# Patient Record
Sex: FEMALE | Race: WHITE | NOT HISPANIC OR LATINO | URBAN - METROPOLITAN AREA
[De-identification: names, ages, dates, MRNs, and addresses within clinical notes are randomized per-mention and may not be internally consistent; named-entity substitution may affect disease eponyms.]

---

## 2020-07-06 ENCOUNTER — INPATIENT (INPATIENT)
Facility: HOSPITAL | Age: 25
LOS: 1 days | Discharge: HOME | End: 2020-07-08
Attending: SPECIALIST | Admitting: SPECIALIST

## 2020-07-06 VITALS — TEMPERATURE: 98 F

## 2020-07-06 DIAGNOSIS — O41.00X0 OLIGOHYDRAMNIOS, UNSPECIFIED TRIMESTER, NOT APPLICABLE OR UNSPECIFIED: ICD-10-CM

## 2020-07-06 DIAGNOSIS — Z3A.00 WEEKS OF GESTATION OF PREGNANCY NOT SPECIFIED: ICD-10-CM

## 2020-07-06 DIAGNOSIS — O26.899 OTHER SPECIFIED PREGNANCY RELATED CONDITIONS, UNSPECIFIED TRIMESTER: ICD-10-CM

## 2020-07-06 LAB
BASOPHILS # BLD AUTO: 0.02 K/UL — SIGNIFICANT CHANGE UP (ref 0–0.2)
BASOPHILS NFR BLD AUTO: 0.2 % — SIGNIFICANT CHANGE UP (ref 0–2)
BLD GP AB SCN SERPL QL: NEGATIVE — SIGNIFICANT CHANGE UP
EOSINOPHIL # BLD AUTO: 0.02 K/UL — SIGNIFICANT CHANGE UP (ref 0–0.5)
EOSINOPHIL NFR BLD AUTO: 0.2 % — SIGNIFICANT CHANGE UP (ref 0–6)
HBV SURFACE AG SER-ACNC: NEGATIVE — SIGNIFICANT CHANGE UP
HCT VFR BLD CALC: 40.7 % — SIGNIFICANT CHANGE UP (ref 34.5–45)
HGB BLD-MCNC: 13.9 G/DL — SIGNIFICANT CHANGE UP (ref 11.5–15.5)
HIV COMBO RESULT: SIGNIFICANT CHANGE UP
HIV1+2 AB SPEC QL: SIGNIFICANT CHANGE UP
IMM GRANULOCYTES NFR BLD AUTO: 0.4 % — SIGNIFICANT CHANGE UP (ref 0–1.5)
LYMPHOCYTES # BLD AUTO: 2.84 K/UL — SIGNIFICANT CHANGE UP (ref 1–3.3)
LYMPHOCYTES # BLD AUTO: 33.7 % — SIGNIFICANT CHANGE UP (ref 13–44)
MCHC RBC-ENTMCNC: 30.5 PG — SIGNIFICANT CHANGE UP (ref 27–34)
MCHC RBC-ENTMCNC: 34.2 % — SIGNIFICANT CHANGE UP (ref 32–36)
MCV RBC AUTO: 89.5 FL — SIGNIFICANT CHANGE UP (ref 80–100)
MONOCYTES # BLD AUTO: 0.56 K/UL — SIGNIFICANT CHANGE UP (ref 0–0.9)
MONOCYTES NFR BLD AUTO: 6.6 % — SIGNIFICANT CHANGE UP (ref 2–14)
NEUTROPHILS # BLD AUTO: 4.96 K/UL — SIGNIFICANT CHANGE UP (ref 1.8–7.4)
NEUTROPHILS NFR BLD AUTO: 58.9 % — SIGNIFICANT CHANGE UP (ref 43–77)
NRBC # FLD: 0 K/UL — SIGNIFICANT CHANGE UP (ref 0–0)
PLATELET # BLD AUTO: 192 K/UL — SIGNIFICANT CHANGE UP (ref 150–400)
PMV BLD: 11.4 FL — SIGNIFICANT CHANGE UP (ref 7–13)
RBC # BLD: 4.55 M/UL — SIGNIFICANT CHANGE UP (ref 3.8–5.2)
RBC # FLD: 12.4 % — SIGNIFICANT CHANGE UP (ref 10.3–14.5)
RH IG SCN BLD-IMP: POSITIVE — SIGNIFICANT CHANGE UP
RH IG SCN BLD-IMP: POSITIVE — SIGNIFICANT CHANGE UP
WBC # BLD: 8.43 K/UL — SIGNIFICANT CHANGE UP (ref 3.8–10.5)
WBC # FLD AUTO: 8.43 K/UL — SIGNIFICANT CHANGE UP (ref 3.8–10.5)

## 2020-07-06 RX ORDER — SODIUM CHLORIDE 9 MG/ML
1000 INJECTION, SOLUTION INTRAVENOUS
Refills: 0 | Status: DISCONTINUED | OUTPATIENT
Start: 2020-07-06 | End: 2020-07-07

## 2020-07-06 RX ORDER — OXYTOCIN 10 UNIT/ML
333.33 VIAL (ML) INJECTION
Qty: 20 | Refills: 0 | Status: COMPLETED | OUTPATIENT
Start: 2020-07-06 | End: 2020-07-06

## 2020-07-06 RX ADMIN — SODIUM CHLORIDE 125 MILLILITER(S): 9 INJECTION, SOLUTION INTRAVENOUS at 17:45

## 2020-07-06 NOTE — OB PROVIDER TRIAGE NOTE - NSOBPROVIDERNOTE_OBGYN_ALL_OB_FT
24 year old female P1 at 40.6weeks Oligo EUGENIO 3 for IOL with po Cytotec   GBS negative 6/12/20     case d/w DR Hailey Nice 19 testing done    Damian ARENAS

## 2020-07-06 NOTE — OB PROVIDER TRIAGE NOTE - NSHPPHYSICALEXAM_GEN_ALL_CORE
pt seen and examined    ICU Vital Signs Last 24 Hrs  T(C): 36.6 (06 Jul 2020 17:26), Max: 36.6 (06 Jul 2020 16:45)  T(F): 97.9 (06 Jul 2020 17:26), Max: 97.9 (06 Jul 2020 16:46)  HR: 74 (06 Jul 2020 17:26) (74 - 74)  BP: 104/68 (06 Jul 2020 17:26) (104/68 - 104/68)  RR: 18 (06 Jul 2020 17:26) (16 - 18)    pt in NAD  AOx3   lungs clear   heart S1 S2   abd soft nontender   placed on EFM     ve 1-2/50/-3 at office at 3pm

## 2020-07-06 NOTE — OB PROVIDER TRIAGE NOTE - HISTORY OF PRESENT ILLNESS
29 year old female  EDC 6/30/20 at 40.6 weeks who presents from office  noted to have oligo   denied any lof vb  feels good fetal movements   state  GBS negative    EFW 7#   denied any ap issues denied any fetal issues     med hx denied

## 2020-07-06 NOTE — OB PROVIDER H&P - ASSESSMENT
24 year old female P1 at 40.6weeks Oligo EUGENIO 3 for IOL with po Cytotec   GBS negative 6/12/20     case d/w DR Hailey Nice 19 testing done    Damian

## 2020-07-06 NOTE — CHART NOTE - NSCHARTNOTEFT_GEN_A_CORE
R1 Progress Note    Patient seen and examined at bedside for assessment for epidural placement. VSE: 3.5/70/-3, confirmed by PGY-4 Cara Green.       NAD  Vital Signs Last 24 Hrs  T(C): 36.6 (06 Jul 2020 17:26), Max: 36.6 (06 Jul 2020 16:45)  T(F): 97.9 (06 Jul 2020 17:26), Max: 97.9 (06 Jul 2020 16:46)  HR: 61 (06 Jul 2020 22:31) (59 - 107)  BP: 108/58 (06 Jul 2020 21:58) (77/53 - 108/58)  BP(mean): --  RR: 18 (06 Jul 2020 17:26) (16 - 18)  SpO2: 97% (06 Jul 2020 22:31) (93% - 100%)    SVE: 3.5/70/-3   EFM: baseline 120s, moderate variability, accelerations present, no decels.   TOCO: q4-5 min.    Plan     - Continue PO cyto   - FHT Category 1   - Start epi     d/w Dr. Hailey Wong PGY-1

## 2020-07-07 LAB
RUBV IGG SER-ACNC: 2 INDEX — SIGNIFICANT CHANGE UP
RUBV IGG SER-IMP: POSITIVE — SIGNIFICANT CHANGE UP
SARS-COV-2 RNA SPEC QL NAA+PROBE: SIGNIFICANT CHANGE UP
T PALLIDUM AB TITR SER: NEGATIVE — SIGNIFICANT CHANGE UP

## 2020-07-07 RX ORDER — SIMETHICONE 80 MG/1
80 TABLET, CHEWABLE ORAL EVERY 4 HOURS
Refills: 0 | Status: DISCONTINUED | OUTPATIENT
Start: 2020-07-07 | End: 2020-07-08

## 2020-07-07 RX ORDER — LANOLIN
1 OINTMENT (GRAM) TOPICAL EVERY 6 HOURS
Refills: 0 | Status: DISCONTINUED | OUTPATIENT
Start: 2020-07-07 | End: 2020-07-08

## 2020-07-07 RX ORDER — PRAMOXINE HYDROCHLORIDE 150 MG/15G
1 AEROSOL, FOAM RECTAL EVERY 4 HOURS
Refills: 0 | Status: DISCONTINUED | OUTPATIENT
Start: 2020-07-07 | End: 2020-07-08

## 2020-07-07 RX ORDER — DIPHENHYDRAMINE HCL 50 MG
25 CAPSULE ORAL EVERY 6 HOURS
Refills: 0 | Status: DISCONTINUED | OUTPATIENT
Start: 2020-07-07 | End: 2020-07-08

## 2020-07-07 RX ORDER — IBUPROFEN 200 MG
600 TABLET ORAL EVERY 6 HOURS
Refills: 0 | Status: COMPLETED | OUTPATIENT
Start: 2020-07-07 | End: 2021-06-05

## 2020-07-07 RX ORDER — ACETAMINOPHEN 500 MG
3 TABLET ORAL
Qty: 0 | Refills: 0 | DISCHARGE
Start: 2020-07-07

## 2020-07-07 RX ORDER — AER TRAVELER 0.5 G/1
1 SOLUTION RECTAL; TOPICAL EVERY 4 HOURS
Refills: 0 | Status: DISCONTINUED | OUTPATIENT
Start: 2020-07-07 | End: 2020-07-08

## 2020-07-07 RX ORDER — IBUPROFEN 200 MG
1 TABLET ORAL
Qty: 0 | Refills: 0 | DISCHARGE
Start: 2020-07-07

## 2020-07-07 RX ORDER — HYDROCORTISONE 1 %
1 OINTMENT (GRAM) TOPICAL EVERY 6 HOURS
Refills: 0 | Status: DISCONTINUED | OUTPATIENT
Start: 2020-07-07 | End: 2020-07-08

## 2020-07-07 RX ORDER — OXYTOCIN 10 UNIT/ML
2 VIAL (ML) INJECTION
Qty: 30 | Refills: 0 | Status: DISCONTINUED | OUTPATIENT
Start: 2020-07-07 | End: 2020-07-08

## 2020-07-07 RX ORDER — ACETAMINOPHEN 500 MG
975 TABLET ORAL
Refills: 0 | Status: DISCONTINUED | OUTPATIENT
Start: 2020-07-07 | End: 2020-07-08

## 2020-07-07 RX ORDER — TETANUS TOXOID, REDUCED DIPHTHERIA TOXOID AND ACELLULAR PERTUSSIS VACCINE, ADSORBED 5; 2.5; 8; 8; 2.5 [IU]/.5ML; [IU]/.5ML; UG/.5ML; UG/.5ML; UG/.5ML
0.5 SUSPENSION INTRAMUSCULAR ONCE
Refills: 0 | Status: DISCONTINUED | OUTPATIENT
Start: 2020-07-07 | End: 2020-07-08

## 2020-07-07 RX ORDER — SODIUM CHLORIDE 9 MG/ML
3 INJECTION INTRAMUSCULAR; INTRAVENOUS; SUBCUTANEOUS EVERY 8 HOURS
Refills: 0 | Status: DISCONTINUED | OUTPATIENT
Start: 2020-07-07 | End: 2020-07-08

## 2020-07-07 RX ORDER — IBUPROFEN 200 MG
600 TABLET ORAL EVERY 6 HOURS
Refills: 0 | Status: DISCONTINUED | OUTPATIENT
Start: 2020-07-07 | End: 2020-07-08

## 2020-07-07 RX ORDER — DIBUCAINE 1 %
1 OINTMENT (GRAM) RECTAL EVERY 6 HOURS
Refills: 0 | Status: DISCONTINUED | OUTPATIENT
Start: 2020-07-07 | End: 2020-07-08

## 2020-07-07 RX ORDER — MAGNESIUM HYDROXIDE 400 MG/1
30 TABLET, CHEWABLE ORAL
Refills: 0 | Status: DISCONTINUED | OUTPATIENT
Start: 2020-07-07 | End: 2020-07-08

## 2020-07-07 RX ORDER — KETOROLAC TROMETHAMINE 30 MG/ML
30 SYRINGE (ML) INJECTION ONCE
Refills: 0 | Status: DISCONTINUED | OUTPATIENT
Start: 2020-07-07 | End: 2020-07-07

## 2020-07-07 RX ORDER — BENZOCAINE 10 %
1 GEL (GRAM) MUCOUS MEMBRANE EVERY 6 HOURS
Refills: 0 | Status: DISCONTINUED | OUTPATIENT
Start: 2020-07-07 | End: 2020-07-08

## 2020-07-07 RX ADMIN — Medication 1000 MILLIUNIT(S)/MIN: at 03:53

## 2020-07-07 RX ADMIN — Medication 2 MILLIUNIT(S)/MIN: at 01:53

## 2020-07-07 RX ADMIN — Medication 1 TABLET(S): at 16:04

## 2020-07-07 RX ADMIN — Medication 975 MILLIGRAM(S): at 21:30

## 2020-07-07 RX ADMIN — SODIUM CHLORIDE 3 MILLILITER(S): 9 INJECTION INTRAMUSCULAR; INTRAVENOUS; SUBCUTANEOUS at 06:00

## 2020-07-07 RX ADMIN — SIMETHICONE 80 MILLIGRAM(S): 80 TABLET, CHEWABLE ORAL at 21:30

## 2020-07-07 RX ADMIN — SODIUM CHLORIDE 3 MILLILITER(S): 9 INJECTION INTRAMUSCULAR; INTRAVENOUS; SUBCUTANEOUS at 16:04

## 2020-07-07 RX ADMIN — Medication 600 MILLIGRAM(S): at 18:00

## 2020-07-07 RX ADMIN — Medication 30 MILLIGRAM(S): at 05:10

## 2020-07-07 RX ADMIN — Medication 975 MILLIGRAM(S): at 15:00

## 2020-07-07 NOTE — OB PROVIDER IHI INDUCTION/AUGMENTATION NOTE - NS_CHECKALL_OBGYN_ALL_OB
Order was written/Contractions pattern was reviewed/FHR was reviewed/H&P was completed/Induction / Augmentation was discussed
Induction / Augmentation was discussed/Order was written/H&P was completed/Contractions pattern was reviewed/FHR was reviewed

## 2020-07-07 NOTE — DISCHARGE NOTE OB - PATIENT PORTAL LINK FT
You can access the FollowMyHealth Patient Portal offered by [facility] by registering at the following website: [email or web address]. By joining [employer]’s FollowMyHealth portal, you will also be able to view your health information using other applications (apps) compatible with our system.

## 2020-07-07 NOTE — CHART NOTE - NSCHARTNOTEFT_GEN_A_CORE
ob attending    pt comfortable  ve 6/80/-2  fht 120 accels occas variable moderate variability  toco irreg  a/p cat 1 fht reassuring  pitocin on  arom clear  continue induction    Soheila YODER

## 2020-07-07 NOTE — CHART NOTE - NSCHARTNOTEFT_GEN_A_CORE
R1 Progress Note    Patient seen and examined at bedside for VSE: 5.5/80/-3, confirmed by PGY-3 Gayle Mehta.       NAD  Vital Signs Last 24 Hrs  T(C): 36.8 (06 Jul 2020 21:14), Max: 36.8 (06 Jul 2020 21:14)  T(F): 98.24 (06 Jul 2020 21:14), Max: 98.24 (06 Jul 2020 21:14)  HR: 57 (07 Jul 2020 01:44) (51 - 107)  BP: 103/59 (07 Jul 2020 01:36) (77/53 - 108/63)  BP(mean): --  RR: 18 (06 Jul 2020 17:26) (16 - 18)  SpO2: 96% (07 Jul 2020 01:41) (93% - 100%)    SVE: 5.5/80/-3  EFM: 120s baseline, moderate variability, accelerations present, no decels.   TOCO: q3m    Plan     - start pitocin    d/w Dr. Hart and Dr. Mccoy-Miguel Wong PGY-1

## 2020-07-07 NOTE — OB RN DELIVERY SUMMARY - NS_SEPSISRSKCALC_OBGYN_ALL_OB_FT
EOS calculated successfully. EOS Risk Factor: 0.5/1000 live births ([facility] national incidence); GA=41w;Temp=98.24; ROM=1.45; GBS='Unknown'; Antibiotics='No antibiotics or any antibiotics < 2 hrs prior to birth'

## 2020-07-07 NOTE — DISCHARGE NOTE OB - MEDICATION SUMMARY - MEDICATIONS TO TAKE
I will START or STAY ON the medications listed below when I get home from the hospital:    PNV Prenatal oral tablet  -- 1 tab(s) by mouth once a day  -- Indication: For Vaginal delivery I will START or STAY ON the medications listed below when I get home from the hospital:    acetaminophen 325 mg oral tablet  -- 3 tab(s) by mouth every 6 hours, As Needed  -- Indication: For pain    ibuprofen 600 mg oral tablet  -- 1 tab(s) by mouth every 6 hours, As Needed  -- Indication: For pain    PNV Prenatal oral tablet  -- 1 tab(s) by mouth once a day  -- Indication: For Vitamins

## 2020-07-07 NOTE — DISCHARGE NOTE OB - CARE PROVIDER_API CALL
Elgin Panda  OBSTETRICS AND GYNECOLOGY  [id number] [place], [place] [address]  Phone: [phone]  Fax: [phone]  Follow Up Time:

## 2020-07-07 NOTE — OB NEONATOLOGY/PEDIATRICIAN DELIVERY SUMMARY - NSPEDSNEONOTESA_OBGYN_ALL_OB_FT
Baby boy born at 40.6 wks via  with category 2 tracing to a 22 y/o  blood type A+ mother. No significant maternal history. Prenatal history of induction for oligohydramnios. PNL nr/immune/-, GBS - on . SROM at 02:33 with bloody fluids. Baby emerged vigorous, crying, was w/d/s/s with APGARS of 8/9 for color. Mom would like to breast and bottle feed, requests Hep B and declines circ. EOS 0.07

## 2020-07-07 NOTE — LACTATION INITIAL EVALUATION - INTERVENTION OUTCOME
verbalizes understanding/Assisted pt with positioning to facilitate deep latch on left breast. Hand expression demonstrated and encouraged--colostrum noted. Reviewed risks of supplementing with formula while breastfeeding. Discussed feeding on demand, recognizing feeding cues and utilizing feeding log. Safe skin to skin, safe sleep and rooming in promoted./good return demonstration/demonstrates understanding of teaching

## 2020-07-07 NOTE — LACTATION INITIAL EVALUATION - BREAST ASSESSMENT (RIGHT)
Patient states she fell last night and this morning with no injury, states she was not dizzy but just lost her balance. Patient also is experiencing tingling in her hands and shaking. States she was having trouble urinating but seemed to get better after taking OTC AZO. Patient is however constipated past four days, been taking miralax but has seen no relief. Patient also has no appetite. Please advise.     soft/medium

## 2020-07-08 VITALS
DIASTOLIC BLOOD PRESSURE: 78 MMHG | OXYGEN SATURATION: 100 % | TEMPERATURE: 98 F | SYSTOLIC BLOOD PRESSURE: 112 MMHG | RESPIRATION RATE: 16 BRPM | HEART RATE: 65 BPM

## 2020-07-08 RX ORDER — ACETAMINOPHEN 500 MG
975 TABLET ORAL
Refills: 0 | Status: DISCONTINUED | OUTPATIENT
Start: 2020-07-08 | End: 2020-07-08

## 2020-07-08 RX ORDER — SENNA PLUS 8.6 MG/1
2 TABLET ORAL DAILY
Refills: 0 | Status: DISCONTINUED | OUTPATIENT
Start: 2020-07-08 | End: 2020-07-08

## 2020-07-08 RX ORDER — IBUPROFEN 200 MG
600 TABLET ORAL EVERY 6 HOURS
Refills: 0 | Status: DISCONTINUED | OUTPATIENT
Start: 2020-07-08 | End: 2020-07-08

## 2020-07-08 RX ADMIN — SENNA PLUS 2 TABLET(S): 8.6 TABLET ORAL at 06:34

## 2020-07-08 RX ADMIN — Medication 600 MILLIGRAM(S): at 11:49

## 2020-07-08 NOTE — PROGRESS NOTE ADULT - SUBJECTIVE AND OBJECTIVE BOX
Post partum Day 1      Pt without complaints    Vital Signs Last 24 Hrs  T(C): 36.6 (07 Jul 2020 18:20), Max: 36.9 (07 Jul 2020 06:30)  T(F): 97.8 (07 Jul 2020 18:20), Max: 98.4 (07 Jul 2020 06:30)  HR: 66 (07 Jul 2020 18:20) (64 - 88)  BP: 107/58 (07 Jul 2020 18:20) (93/54 - 110/60)  BP(mean): --  RR: 18 (07 Jul 2020 18:20) (18 - 18)  SpO2: 98% (07 Jul 2020 18:20) (98% - 100%)                        13.9   8.43  )-----------( 192      ( 06 Jul 2020 17:45 )             40.7     MEDICATIONS  (STANDING):  acetaminophen   Tablet .. 975 milliGRAM(s) Oral <User Schedule>  diphtheria/tetanus/pertussis (acellular) Vaccine (ADAcel) 0.5 milliLiter(s) IntraMuscular once  ibuprofen  Tablet. 600 milliGRAM(s) Oral every 6 hours  prenatal multivitamin 1 Tablet(s) Oral daily  sodium chloride 0.9% lock flush 3 milliLiter(s) IV Push every 8 hours    MEDICATIONS  (PRN):  benzocaine 20%/menthol 0.5% Spray 1 Spray(s) Topical every 6 hours PRN for Perineal discomfort  dibucaine 1% Ointment 1 Application(s) Topical every 6 hours PRN Perineal discomfort  diphenhydrAMINE 25 milliGRAM(s) Oral every 6 hours PRN Pruritus  hydrocortisone 1% Cream 1 Application(s) Topical every 6 hours PRN Moderate Pain (4-6)  lanolin Ointment 1 Application(s) Topical every 6 hours PRN nipple soreness  magnesium hydroxide Suspension 30 milliLiter(s) Oral two times a day PRN Constipation  pramoxine 1%/zinc 5% Cream 1 Application(s) Topical every 4 hours PRN Moderate Pain (4-6)  simethicone 80 milliGRAM(s) Chew every 4 hours PRN Gas  witch hazel Pads 1 Application(s) Topical every 4 hours PRN Perineal discomfort      Abdomen soft  fundus firm, non tender  extremities non tender    lochia wnl      Patient doing well  Routine post partum care

## 2022-08-11 NOTE — OB RN PATIENT PROFILE - HEALTH/HEALTHCARE ANXIETIES, PROFILE
Infusion Nursing Note:  Dillon Salter presents today for blood transfusion.    Patient seen by provider today: No   present during visit today: Not Applicable.    Note: BP (!) 140/77   Pulse 104   Temp 98  F (36.7  C) (Oral)   Resp 16   SpO2 99%     Intravenous Access:  Labs drawn without difficulty.  Peripheral IV placed.    Treatment Conditions:  Lab Results   Component Value Date    HGB 6.2 (LL) 08/11/2022    WBC 18.5 (H) 08/11/2022    ANEU 14.5 (H) 07/28/2022    ANEUTAUTO 10.8 (H) 08/04/2022    PLT 98 (L) 08/11/2022      Blood transfusion consent signed 6/27/22.  Hemoglobin 6.2 today, therefore 1 unit of blood ordered.    Post Infusion Assessment:  1 unit of blood transfused. Patient tolerated infusion without incident.  Blood return noted pre and post infusion.  Site patent and intact, free from redness, edema or discomfort.  No evidence of extravasations.  Access discontinued per protocol.     Discharge Plan:   Patient and/or family verbalized understanding of discharge instructions and all questions answered.  Patient discharged in stable condition accompanied by: self.  Departure Mode: Ambulatory.      Sony Mckeon RN                      
none

## 2023-10-02 NOTE — OB RN DELIVERY SUMMARY - BABY A: APGAR 5 MIN MUSCLE TONE, DELIVERY
Lvm for pt to call back to [facility] fu with zion montez over new issue pt is having (bloody nipple discharge)   (2) well flexed